# Patient Record
(demographics unavailable — no encounter records)

---

## 2025-01-21 NOTE — PHYSICAL EXAM
[Alert] : alert [No Acute Distress] : no acute distress [Normal Voice/Communication] : normal voice communication [Normal Sclera/Conjunctiva] : normal sclera/conjunctiva [EOMI] : extra ocular movement intact [No Proptosis] : no proptosis [No Lid Lag] : no lid lag [No Thyroid Nodules] : no palpable thyroid nodules [No Respiratory Distress] : no respiratory distress [Clear to Auscultation] : lungs were clear to auscultation bilaterally [Normal PMI] : the apical impulse was normal [Normal Rate] : heart rate was normal [Regular Rhythm] : with a regular rhythm [No Edema] : no peripheral edema [Normal Bowel Sounds] : normal bowel sounds [Soft] : abdomen soft [Normal Gait] : normal gait [No Joint Swelling] : no joint swelling seen [Normal Reflexes] : deep tendon reflexes were 2+ and symmetric [Oriented x3] : oriented to person, place, and time [Normal Insight/Judgement] : insight and judgment were intact [de-identified] : thyroid full 50 G [de-identified] : Positive fine tremor

## 2025-01-21 NOTE — ASSESSMENT
[FreeTextEntry1] : Patient with a history of hyperthyroidism. He is not aware if this was due to Graves' disease. TRAB and TSI were negative and had mildly + TPO.  Will check labs today.  If his labs are stable he will followup in 6 months. He was advised not to randomly take MTM' if not feeling well to get in touch with me.  We did discuss if the diagnosis of hyperthyroidism is due to Graves' disease there is a greater than 50% chance of recurrence. If it is due to thyroid nodules it usually does not resolve with MTM and either EDOUARD or surgery is necessary. At this time he does appear euthyroid, he is aware he has mild IGT. No nodules were seen on sonogram.

## 2025-01-21 NOTE — HISTORY OF PRESENT ILLNESS
[FreeTextEntry1] : Patient with a history of hyperthyroidism diagnosed approximately 10years ago; he was on methimazole for almost 4 years and stopped this in October 2018. Since he is off the medication he is feeling fine and has not noticed any recurrence of symptoms of hyperthyroidism. His weight is stable. He denies any shortness of breath, palpitations, sweats or  tremors.He had never had any thyroid sonograms or nuclear scans. a recent sonogram showed mild thyromegaly and heterogeneity but no nodules.  He does admit to taking methimazole intermittently when feeling off, but has not taken for a fewyears.